# Patient Record
Sex: FEMALE | Race: OTHER | HISPANIC OR LATINO | URBAN - METROPOLITAN AREA
[De-identification: names, ages, dates, MRNs, and addresses within clinical notes are randomized per-mention and may not be internally consistent; named-entity substitution may affect disease eponyms.]

---

## 2018-12-20 ENCOUNTER — EMERGENCY (EMERGENCY)
Facility: HOSPITAL | Age: 33
LOS: 1 days | Discharge: HOME | End: 2018-12-20
Admitting: EMERGENCY MEDICINE
Payer: OTHER MISCELLANEOUS

## 2018-12-20 VITALS
DIASTOLIC BLOOD PRESSURE: 74 MMHG | HEART RATE: 79 BPM | OXYGEN SATURATION: 99 % | TEMPERATURE: 98 F | SYSTOLIC BLOOD PRESSURE: 113 MMHG | RESPIRATION RATE: 16 BRPM

## 2018-12-20 VITALS
OXYGEN SATURATION: 100 % | SYSTOLIC BLOOD PRESSURE: 150 MMHG | DIASTOLIC BLOOD PRESSURE: 92 MMHG | TEMPERATURE: 98 F | RESPIRATION RATE: 18 BRPM | HEART RATE: 105 BPM

## 2018-12-20 LAB
APPEARANCE UR: CLEAR — SIGNIFICANT CHANGE UP
BILIRUB UR-MCNC: NEGATIVE — SIGNIFICANT CHANGE UP
COLOR SPEC: YELLOW — SIGNIFICANT CHANGE UP
DIFF PNL FLD: NEGATIVE — SIGNIFICANT CHANGE UP
GLUCOSE UR QL: NEGATIVE — SIGNIFICANT CHANGE UP
HCG UR QL: POSITIVE — SIGNIFICANT CHANGE UP
KETONES UR-MCNC: NEGATIVE — SIGNIFICANT CHANGE UP
LEUKOCYTE ESTERASE UR-ACNC: ABNORMAL
NITRITE UR-MCNC: NEGATIVE — SIGNIFICANT CHANGE UP
PH UR: 6.5 — SIGNIFICANT CHANGE UP (ref 5–8)
PROT UR-MCNC: NEGATIVE MG/DL — SIGNIFICANT CHANGE UP
SP GR SPEC: 1.02 — SIGNIFICANT CHANGE UP (ref 1–1.03)
UROBILINOGEN FLD QL: 0.2 E.U./DL — SIGNIFICANT CHANGE UP

## 2018-12-20 PROCEDURE — 99284 EMERGENCY DEPT VISIT MOD MDM: CPT

## 2018-12-20 PROCEDURE — 76815 OB US LIMITED FETUS(S): CPT | Mod: 26

## 2018-12-20 RX ORDER — ACETAMINOPHEN 500 MG
1 TABLET ORAL
Qty: 20 | Refills: 0 | OUTPATIENT
Start: 2018-12-20

## 2018-12-20 RX ORDER — CEFUROXIME AXETIL 250 MG
250 TABLET ORAL ONCE
Qty: 0 | Refills: 0 | Status: COMPLETED | OUTPATIENT
Start: 2018-12-20 | End: 2018-12-20

## 2018-12-20 RX ORDER — CEPHALEXIN 500 MG
1 CAPSULE ORAL
Qty: 14 | Refills: 0 | OUTPATIENT
Start: 2018-12-20 | End: 2018-12-26

## 2018-12-20 RX ORDER — ACETAMINOPHEN 500 MG
975 TABLET ORAL ONCE
Qty: 0 | Refills: 0 | Status: COMPLETED | OUTPATIENT
Start: 2018-12-20 | End: 2018-12-20

## 2018-12-20 RX ADMIN — Medication 975 MILLIGRAM(S): at 17:05

## 2018-12-20 RX ADMIN — Medication 250 MILLIGRAM(S): at 19:32

## 2018-12-20 RX ADMIN — Medication 975 MILLIGRAM(S): at 17:50

## 2018-12-20 NOTE — ED PROVIDER NOTE - CARE PLAN
Principal Discharge DX:	Fall  Secondary Diagnosis:	Knee contusion  Secondary Diagnosis:	Pregnancy Principal Discharge DX:	Fall  Secondary Diagnosis:	Knee contusion  Secondary Diagnosis:	Pregnancy  Secondary Diagnosis:	UTI (urinary tract infection)

## 2018-12-20 NOTE — ED ADULT TRIAGE NOTE - CHIEF COMPLAINT QUOTE
patient had a mechanical fall. patient is 23 weeks pregnant, crying during triage. patient states she does not remember if she had trauma to stomach during fall. no visible signs of trauma. patient c/o pain to bilateral knees.

## 2018-12-20 NOTE — ED PROVIDER NOTE - MEDICAL DECISION MAKING DETAILS
pt p/w b/l knee pain s/p mechanical fall at work, no associated head trauma or prodromes, abd NT, +fetal movements and no associated pain or vaginal bleeding or leakage of fluids, bedside US and official US with normal IUP with FHR in 160s, U/A positive with marly/wbc, will tx with course of ceftin, precautions discussed, RICE to affected region, weight bear as tolerated, f/u with OB and ortho, pt and spouse verbalized understanding

## 2018-12-20 NOTE — ED ADULT NURSE NOTE - CHPI ED NUR SYMPTOMS NEG
no loss of consciousness/no numbness/no abrasion/no confusion/no deformity/no vomiting/no weakness/no fever/no bleeding/no tingling

## 2018-12-20 NOTE — ED PROVIDER NOTE - OBJECTIVE STATEMENT
34 yo F with no known PMHx, , LMP 18, currently 23 wks, with ELMER 19, last US 2 wks ago wnl, BIBA for b/l knee pain s/p mechanical fall at work.  Pt reports her shoes being stuck on wooden floors at work and fell onto her knees, braced herself with her arms, but unsure if she hit her abdomen.  Reports pain to her b/l knees only.  Denies prodromes, head trauma, LOC, bleeding, HA, dizziness, abdominal pain, vaginal bleeding, leakage of fluids, decreased fetal movements, SOB, CP, palpitations, N/V, change in ROM/sensation, abdominal/back/neck pain, focal weakness, change in vision/mental status/speech, paresthesia, and malaise. Pt is ambulatory s/p fall. Not on any AC currently.

## 2018-12-20 NOTE — ED ADULT NURSE NOTE - OBJECTIVE STATEMENT
33 y.o F who is 23 weeks pregnant presents to ED s/p mechanical fall. Pt reports pain to bilateral knees. Pt unsure if she had stomach trauma. No vaginal bleeding, HA, N/V, dizziness, CP or SOB.

## 2018-12-20 NOTE — ED PROVIDER NOTE - PHYSICAL EXAMINATION
Vital Signs - nursing notes reviewed and confirmed  Gen - WDWN F, NAD, tearful but no respiratory distress, speaking in full sentences   Skin - warm, dry, intact  HEENT - AT/NC, PERRL, EOMI, no conjunctival injection, moist oral mucosa, TM intact b/l with good cone of lights, o/p clear with no erythema, edema, or exudate, uvula midline, airway patent, neck supple and NT, FROM  CV - S1S2, R/R/R  Resp - respiration non-labored, CTAB, symmetric bs b/l, no r/r/w  GI - NABS, soft, gravid, NT, no rebound or guarding, no CVAT b/l   MS - w/w/p, b/l knee TTP with mild abrasion but no focal erythema, edema, ecchymosis, crepitus or laxity, FROM, NV intact, +SILT, calves supple and NT, distal pulses symmetric b/l, brisk cap refills,  Neuro - AxOx3, no focal neuro deficits, CN II-XII grossly intact, ambulatory without gait disturbance

## 2018-12-20 NOTE — ED PROVIDER NOTE - CARE PROVIDER_API CALL
your private ob,   Phone: (   )    -  Fax: (   )    -    Jaziel Rueda (MD), [facility]  [address]  [address] [place] [address]  Phone: [phone]  Fax: [phone]

## 2018-12-20 NOTE — ED PROVIDER NOTE - PROGRESS NOTE DETAILS
bedside us performed with +IUP and active FHR in 150-160s, no free fluid noted, abd NT, will obtain official US for full evaluation, continue monitoring and current management

## 2018-12-21 LAB
CULTURE RESULTS: SIGNIFICANT CHANGE UP
SPECIMEN SOURCE: SIGNIFICANT CHANGE UP

## 2018-12-24 DIAGNOSIS — M25.561 PAIN IN RIGHT KNEE: ICD-10-CM
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
## 2018-12-24 DIAGNOSIS — Z3A.23 23 WEEKS GESTATION OF PREGNANCY: ICD-10-CM
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
## 2018-12-24 DIAGNOSIS — S80.02XA CONTUSION OF LEFT KNEE, INITIAL ENCOUNTER: ICD-10-CM

## 2018-12-24 DIAGNOSIS — S80.01XA CONTUSION OF RIGHT KNEE, INITIAL ENCOUNTER: ICD-10-CM

## 2018-12-24 DIAGNOSIS — O23.42 UNSPECIFIED INFECTION OF URINARY TRACT IN PREGNANCY, SECOND TRIMESTER: ICD-10-CM
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
